# Patient Record
Sex: MALE | Race: WHITE | NOT HISPANIC OR LATINO | Employment: FULL TIME | URBAN - METROPOLITAN AREA
[De-identification: names, ages, dates, MRNs, and addresses within clinical notes are randomized per-mention and may not be internally consistent; named-entity substitution may affect disease eponyms.]

---

## 2017-01-18 ENCOUNTER — TRANSCRIBE ORDERS (OUTPATIENT)
Dept: ADMINISTRATIVE | Facility: HOSPITAL | Age: 32
End: 2017-01-18

## 2017-01-18 ENCOUNTER — APPOINTMENT (OUTPATIENT)
Dept: LAB | Facility: CLINIC | Age: 32
End: 2017-01-18
Payer: COMMERCIAL

## 2017-01-18 DIAGNOSIS — E05.90 PRETIBIAL MYXEDEMA: Primary | ICD-10-CM

## 2017-01-18 DIAGNOSIS — E05.90 PRETIBIAL MYXEDEMA: ICD-10-CM

## 2017-01-18 LAB
BASOPHILS # BLD AUTO: 0.02 THOUSANDS/ΜL (ref 0–0.1)
BASOPHILS NFR BLD AUTO: 0 % (ref 0–1)
EOSINOPHIL # BLD AUTO: 0.43 THOUSAND/ΜL (ref 0–0.61)
EOSINOPHIL NFR BLD AUTO: 7 % (ref 0–6)
ERYTHROCYTE [DISTWIDTH] IN BLOOD BY AUTOMATED COUNT: 12.7 % (ref 11.6–15.1)
HCT VFR BLD AUTO: 43.2 % (ref 36.5–49.3)
HGB BLD-MCNC: 15.3 G/DL (ref 12–17)
LYMPHOCYTES # BLD AUTO: 2.45 THOUSANDS/ΜL (ref 0.6–4.47)
LYMPHOCYTES NFR BLD AUTO: 37 % (ref 14–44)
MCH RBC QN AUTO: 29.3 PG (ref 26.8–34.3)
MCHC RBC AUTO-ENTMCNC: 35.4 G/DL (ref 31.4–37.4)
MCV RBC AUTO: 83 FL (ref 82–98)
MONOCYTES # BLD AUTO: 0.33 THOUSAND/ΜL (ref 0.17–1.22)
MONOCYTES NFR BLD AUTO: 5 % (ref 4–12)
NEUTROPHILS # BLD AUTO: 3.42 THOUSANDS/ΜL (ref 1.85–7.62)
NEUTS SEG NFR BLD AUTO: 51 % (ref 43–75)
NRBC BLD AUTO-RTO: 0 /100 WBCS
PLATELET # BLD AUTO: 202 THOUSANDS/UL (ref 149–390)
PMV BLD AUTO: 10.2 FL (ref 8.9–12.7)
RBC # BLD AUTO: 5.22 MILLION/UL (ref 3.88–5.62)
TSH SERPL DL<=0.05 MIU/L-ACNC: 1.73 UIU/ML (ref 0.36–3.74)
WBC # BLD AUTO: 6.66 THOUSAND/UL (ref 4.31–10.16)

## 2017-01-18 PROCEDURE — 85025 COMPLETE CBC W/AUTO DIFF WBC: CPT

## 2017-01-18 PROCEDURE — 84443 ASSAY THYROID STIM HORMONE: CPT

## 2017-01-18 PROCEDURE — 36415 COLL VENOUS BLD VENIPUNCTURE: CPT

## 2017-09-19 ENCOUNTER — OFFICE VISIT (OUTPATIENT)
Dept: URGENT CARE | Facility: CLINIC | Age: 32
End: 2017-09-19
Payer: COMMERCIAL

## 2017-09-19 PROCEDURE — 99213 OFFICE O/P EST LOW 20 MIN: CPT

## 2017-09-19 PROCEDURE — 10060 I&D ABSCESS SIMPLE/SINGLE: CPT

## 2022-02-03 ENCOUNTER — TRANSCRIBE ORDERS (OUTPATIENT)
Dept: SURGERY | Facility: CLINIC | Age: 37
End: 2022-02-03

## 2022-02-03 DIAGNOSIS — L72.3 SEBACEOUS CYST: Primary | ICD-10-CM

## 2022-02-16 RX ORDER — FENOFIBRATE 134 MG/1
1 CAPSULE ORAL DAILY
COMMUNITY
Start: 2021-01-29

## 2022-02-16 RX ORDER — MULTIVIT-MIN/IRON FUM/FOLIC AC 7.5 MG-4
1 TABLET ORAL DAILY
COMMUNITY

## 2022-02-16 RX ORDER — LANOLIN ALCOHOL/MO/W.PET/CERES
1 CREAM (GRAM) TOPICAL DAILY
COMMUNITY

## 2022-02-18 ENCOUNTER — CONSULT (OUTPATIENT)
Dept: SURGERY | Facility: CLINIC | Age: 37
End: 2022-02-18
Payer: COMMERCIAL

## 2022-02-18 VITALS
HEART RATE: 89 BPM | OXYGEN SATURATION: 97 % | BODY MASS INDEX: 26.98 KG/M2 | TEMPERATURE: 98.3 F | DIASTOLIC BLOOD PRESSURE: 78 MMHG | WEIGHT: 178 LBS | RESPIRATION RATE: 18 BRPM | SYSTOLIC BLOOD PRESSURE: 140 MMHG | HEIGHT: 68 IN

## 2022-02-18 DIAGNOSIS — L72.3 SEBACEOUS CYST: ICD-10-CM

## 2022-02-18 DIAGNOSIS — L72.3 INFECTED SEBACEOUS CYST OF SKIN: Primary | ICD-10-CM

## 2022-02-18 DIAGNOSIS — L08.9 INFECTED SEBACEOUS CYST OF SKIN: Primary | ICD-10-CM

## 2022-02-18 PROCEDURE — 88305 TISSUE EXAM BY PATHOLOGIST: CPT | Performed by: PATHOLOGY

## 2022-02-18 PROCEDURE — 11406 EXC TR-EXT B9+MARG >4.0 CM: CPT | Performed by: SURGERY

## 2022-02-18 PROCEDURE — 99203 OFFICE O/P NEW LOW 30 MIN: CPT | Performed by: SURGERY

## 2022-02-18 NOTE — LETTER
February 18, 2022     [address] [facility] [address] [facility]    Patient: Tramaine Day   YOB: 1985   Date of Visit: 2/18/2022       Dear Dr Monica Flores:    Thank you for referring Tramaine Day to me for evaluation  Below are my notes for this consultation  If you have questions, please do not hesitate to call me  I look forward to following your patient along with you  Sincerely,        Steve Seo MD        CC: No Recipients  Steve Seo MD  2/18/2022 10:27 AM  Sign when Signing Visit  Assessment/Plan:    Infected sebaceous cyst of skin  The patient is a pleasant 29-year-old male presenting with a several year history of recurrent infections of a upper right back sebaceous cyst for which definitive treatment by excisional biopsy is now indicated  According the patient he has been aware of the sebaceous cyst for several years  It has been infected several times over that period of time most recently in January of 2021  The patient waited for resolution of the acute inflammatory process to present the office today for definitive treatment  On physical examination is a pleasant well-nourished well-developed male in no acute distress  Awake alert oriented  Competent reliable as a historian  On the upper right back over the scapula is a 13 by 15 mm chronic sebaceous cyst of the skin of the upper right back  I have advised excisional biopsy here in the office under local anesthesia  The technical details of the procedures well as the options benefits risks and alternatives reviewed  All questions answered to his satisfaction and informed verbal consent taken to proceed  Subjective:      Patient ID: Tramaine Day is a 29 y o  male  Patient is here today for a cyst on his right shoulderbladethat has been present for about 5 years  States the cyst has open and drain over the course of 6-8- months  No fever or chills    [employer] [facility] ELOISE Kaba          Review of Systems   Constitutional: Negative for chills and fever  HENT: Negative for ear pain and sore throat  Eyes: Negative for pain and visual disturbance  Respiratory: Negative for cough and shortness of breath  Cardiovascular: Negative for chest pain and palpitations  Gastrointestinal: Negative for abdominal pain and vomiting  Genitourinary: Negative for dysuria and hematuria  Musculoskeletal: Negative for arthralgias and back pain  Skin: Negative for color change and rash  Neurological: Negative for seizures and syncope  All other systems reviewed and are negative  Objective:      /78 (BP Location: Left arm, Patient Position: Sitting, Cuff Size: Standard)   Pulse 89   Temp 98 3 °F (36 8 °C) (Temporal)   Resp 18   Ht 5' 8" (1 727 m)   Wt 80 7 kg (178 lb)   SpO2 97%   BMI 27 06 kg/m²            Physical Exam  Vitals and nursing note reviewed  Constitutional:       Appearance: He is well-developed  HENT:      Head: Normocephalic and atraumatic  Eyes:      Conjunctiva/sclera: Conjunctivae normal       Pupils: Pupils are equal, round, and reactive to light  Cardiovascular:      Rate and Rhythm: Normal rate and regular rhythm  Pulmonary:      Effort: Pulmonary effort is normal       Breath sounds: Normal breath sounds  Abdominal:      General: Bowel sounds are normal       Palpations: Abdomen is soft  Musculoskeletal:         General: Normal range of motion  Cervical back: Normal range of motion and neck supple  Skin:     General: Skin is warm and dry  Comments: Upper right back 15 x 13 mm sebaceous cyst   Neurological:      Mental Status: He is alert and oriented to person, place, and time  Psychiatric:         Behavior: Behavior normal          Thought Content:  Thought content normal          Judgment: Judgment normal        Skin excision    Date/Time: 2/18/2022 10:26 AM  Performed by: Ej Glover MD  Authorized by: Bay Sherman MD   Universal Protocol:  Consent: Verbal consent obtained  Risks and benefits: risks, benefits and alternatives were discussed  Consent given by: patient  Time out: Immediately prior to procedure a "time out" was called to verify the correct patient, procedure, equipment, support staff and site/side marked as required  Timeout called at: 2/18/2022 10:26 AM   Patient understanding: patient states understanding of the procedure being performed  Patient consent: the patient's understanding of the procedure matches consent given  Site marked: the operative site was marked  Patient identity confirmed: verbally with patient      Procedure Details - Skin Excision:     Number of Lesions:  1  Lesion 1:     Body area:  Trunk    Trunk location:  Back       Malignancy: benign lesion      Repair type:  Linear closure    Graft type: full-thickness      Closure complexity: intermediate       Repair Comments: Procedure note: With informed verbal consent under sterile conditions using aseptic technique using 1% lidocaine local anesthesia with epinephrine and bicarbonate the 15 x 13 mm upper right back sebaceous cyst was sharply excised with an ellipse of skin  The wound closed in 2 layers with interrupted sutures of 4-0 Vicryl on deep dermis and vertical mattress sutures of 3-0 nylon on skin

## 2022-02-18 NOTE — PROGRESS NOTES
Assessment/Plan:    Infected sebaceous cyst of skin  The patient is a pleasant 58-year-old male presenting with a several year history of recurrent infections of a upper right back sebaceous cyst for which definitive treatment by excisional biopsy is now indicated  According the patient he has been aware of the sebaceous cyst for several years  It has been infected several times over that period of time most recently in January of 2021  The patient waited for resolution of the acute inflammatory process to present the office today for definitive treatment  On physical examination is a pleasant well-nourished well-developed male in no acute distress  Awake alert oriented  Competent reliable as a historian  On the upper right back over the scapula is a 13 by 15 mm chronic sebaceous cyst of the skin of the upper right back  I have advised excisional biopsy here in the office under local anesthesia  The technical details of the procedures well as the options benefits risks and alternatives reviewed  All questions answered to his satisfaction and informed verbal consent taken to proceed  Subjective:      Patient ID: Jemima Tinsley is a 29 y o  male  Patient is here today for a cyst on his right shoulderbladethat has been present for about 5 years  States the cyst has open and drain over the course of 6-8- months  No fever or chills  Eva Kaba MA          Review of Systems   Constitutional: Negative for chills and fever  HENT: Negative for ear pain and sore throat  Eyes: Negative for pain and visual disturbance  Respiratory: Negative for cough and shortness of breath  Cardiovascular: Negative for chest pain and palpitations  Gastrointestinal: Negative for abdominal pain and vomiting  Genitourinary: Negative for dysuria and hematuria  Musculoskeletal: Negative for arthralgias and back pain  Skin: Negative for color change and rash     Neurological: Negative for seizures and syncope  All other systems reviewed and are negative  Objective:      /78 (BP Location: Left arm, Patient Position: Sitting, Cuff Size: Standard)   Pulse 89   Temp 98 3 °F (36 8 °C) (Temporal)   Resp 18   Ht 5' 8" (1 727 m)   Wt 80 7 kg (178 lb)   SpO2 97%   BMI 27 06 kg/m²            Physical Exam  Vitals and nursing note reviewed  Constitutional:       Appearance: He is well-developed  HENT:      Head: Normocephalic and atraumatic  Eyes:      Conjunctiva/sclera: Conjunctivae normal       Pupils: Pupils are equal, round, and reactive to light  Cardiovascular:      Rate and Rhythm: Normal rate and regular rhythm  Pulmonary:      Effort: Pulmonary effort is normal       Breath sounds: Normal breath sounds  Abdominal:      General: Bowel sounds are normal       Palpations: Abdomen is soft  Musculoskeletal:         General: Normal range of motion  Cervical back: Normal range of motion and neck supple  Skin:     General: Skin is warm and dry  Comments: Upper right back 15 x 13 mm sebaceous cyst   Neurological:      Mental Status: He is alert and oriented to person, place, and time  Psychiatric:         Behavior: Behavior normal          Thought Content: Thought content normal          Judgment: Judgment normal        Skin excision    Date/Time: 2/18/2022 10:26 AM  Performed by: Lory Cerda MD  Authorized by: Lory Cerda MD   Universal Protocol:  Consent: Verbal consent obtained  Risks and benefits: risks, benefits and alternatives were discussed  Consent given by: patient  Time out: Immediately prior to procedure a "time out" was called to verify the correct patient, procedure, equipment, support staff and site/side marked as required    Timeout called at: 2/18/2022 10:26 AM   Patient understanding: patient states understanding of the procedure being performed  Patient consent: the patient's understanding of the procedure matches consent given  Site marked: the operative site was marked  Patient identity confirmed: verbally with patient      Procedure Details - Skin Excision:     Number of Lesions:  1  Lesion 1:     Body area:  Trunk    Trunk location:  Back       Malignancy: benign lesion      Repair type:  Linear closure    Graft type: full-thickness      Closure complexity: intermediate       Repair Comments: Procedure note: With informed verbal consent under sterile conditions using aseptic technique using 1% lidocaine local anesthesia with epinephrine and bicarbonate the 15 x 13 mm upper right back sebaceous cyst was sharply excised with an ellipse of skin  The wound closed in 2 layers with interrupted sutures of 4-0 Vicryl on deep dermis and vertical mattress sutures of 3-0 nylon on skin

## 2022-02-18 NOTE — ASSESSMENT & PLAN NOTE
The patient is a pleasant 28-year-old male presenting with a several year history of recurrent infections of a upper right back sebaceous cyst for which definitive treatment by excisional biopsy is now indicated  According the patient he has been aware of the sebaceous cyst for several years  It has been infected several times over that period of time most recently in January of 2021  The patient waited for resolution of the acute inflammatory process to present the office today for definitive treatment  On physical examination is a pleasant well-nourished well-developed male in no acute distress  Awake alert oriented  Competent reliable as a historian  On the upper right back over the scapula is a 13 by 15 mm chronic sebaceous cyst of the skin of the upper right back  I have advised excisional biopsy here in the office under local anesthesia  The technical details of the procedures well as the options benefits risks and alternatives reviewed  All questions answered to his satisfaction and informed verbal consent taken to proceed

## 2022-02-25 ENCOUNTER — TELEPHONE (OUTPATIENT)
Dept: SURGERY | Facility: CLINIC | Age: 37
End: 2022-02-25

## 2022-03-04 ENCOUNTER — OFFICE VISIT (OUTPATIENT)
Dept: SURGERY | Facility: CLINIC | Age: 37
End: 2022-03-04

## 2022-03-04 VITALS — TEMPERATURE: 98.2 F

## 2022-03-04 DIAGNOSIS — L08.9 INFECTED SEBACEOUS CYST OF SKIN: Primary | ICD-10-CM

## 2022-03-04 DIAGNOSIS — L72.3 INFECTED SEBACEOUS CYST OF SKIN: Primary | ICD-10-CM

## 2022-03-04 PROCEDURE — 99024 POSTOP FOLLOW-UP VISIT: CPT | Performed by: PHYSICIAN ASSISTANT

## 2022-03-04 NOTE — PROGRESS NOTES
Post-Op Note - General Surgery   Radha Giron 29 y o  male MRN: [id number]  Encounter: [id number]    Assessment/Plan    Infected sebaceous cyst of skin  Doing well after excision  On exam no signs of wound infection or complication  Sutures removed, copy of path reports benign epidermoid cyst  Will see back as needed  Diagnoses and all orders for this visit:    Infected sebaceous cyst of skin        Subjective      Chief Complaint   Patient presents with    Suture / Staple Removal     Suture removal, s/p exc of right upper back 02-     Patient is here today for suture removal, s/p exc of right upper back lump 2-  Sutures were removed and the excision site looks good and is healing nicely  Denied any new problems or concerns  Denice Gomez 30 yo M here after excision of cyst from back  Reports no problems post-op  No drainage or fevers  Review of Systems    The following portions of the patient's history were reviewed and updated as appropriate: allergies, current medications, past family history, past medical history, past social history, past surgical history and problem list     Objective      Temperature 98 2 °F (36 8 °C), temperature source Temporal    Physical Exam  Vitals and nursing note reviewed  Constitutional:       General: He is not in acute distress  Appearance: He is well-developed  He is not diaphoretic  HENT:      Head: Normocephalic and atraumatic  Eyes:      Conjunctiva/sclera: Conjunctivae normal       Pupils: Pupils are equal, round, and reactive to light  Pulmonary:      Effort: No respiratory distress  Musculoskeletal:         General: Normal range of motion  Cervical back: Normal range of motion  Skin:     General: Skin is warm and dry  Capillary Refill: Capillary refill takes less than 2 seconds  Comments: Incision is C/D/I  No signs of infection     Neurological:      Mental Status: He is alert and oriented to person, place, and time     Psychiatric:         Behavior: Behavior normal          Signature:  Ugo Hanna PA-C  Date: 3/4/2022 Time: 10:52 AM

## 2022-03-04 NOTE — ASSESSMENT & PLAN NOTE
Doing well after excision  On exam no signs of wound infection or complication  Sutures removed, copy of path reports benign epidermoid cyst  Will see back as needed